# Patient Record
Sex: MALE | Race: BLACK OR AFRICAN AMERICAN | URBAN - METROPOLITAN AREA
[De-identification: names, ages, dates, MRNs, and addresses within clinical notes are randomized per-mention and may not be internally consistent; named-entity substitution may affect disease eponyms.]

---

## 2017-08-04 ENCOUNTER — OFFICE VISIT (OUTPATIENT)
Dept: FAMILY MEDICINE CLINIC | Age: 77
End: 2017-08-04

## 2017-08-04 ENCOUNTER — HOSPITAL ENCOUNTER (OUTPATIENT)
Dept: LAB | Age: 77
Discharge: HOME | End: 2017-08-04
Payer: MEDICARE

## 2017-08-04 VITALS
BODY MASS INDEX: 28.72 KG/M2 | TEMPERATURE: 97.4 F | HEIGHT: 67 IN | DIASTOLIC BLOOD PRESSURE: 87 MMHG | RESPIRATION RATE: 16 BRPM | OXYGEN SATURATION: 96 % | WEIGHT: 183 LBS | HEART RATE: 60 BPM | SYSTOLIC BLOOD PRESSURE: 128 MMHG

## 2017-08-04 DIAGNOSIS — E11.9 CONTROLLED TYPE 2 DIABETES MELLITUS WITHOUT COMPLICATION, WITHOUT LONG-TERM CURRENT USE OF INSULIN (HCC): Primary | ICD-10-CM

## 2017-08-04 DIAGNOSIS — Z13.31 SCREENING FOR DEPRESSION: ICD-10-CM

## 2017-08-04 DIAGNOSIS — T46.4X5A COUGH DUE TO ACE INHIBITOR: ICD-10-CM

## 2017-08-04 DIAGNOSIS — R05.8 COUGH DUE TO ACE INHIBITOR: ICD-10-CM

## 2017-08-04 DIAGNOSIS — Z12.11 COLON CANCER SCREENING: ICD-10-CM

## 2017-08-04 DIAGNOSIS — R97.20 ELEVATED PSA: ICD-10-CM

## 2017-08-04 DIAGNOSIS — E11.9 CONTROLLED TYPE 2 DIABETES MELLITUS WITHOUT COMPLICATION, WITHOUT LONG-TERM CURRENT USE OF INSULIN (HCC): ICD-10-CM

## 2017-08-04 DIAGNOSIS — E78.00 PURE HYPERCHOLESTEROLEMIA: ICD-10-CM

## 2017-08-04 DIAGNOSIS — H54.7 VISION PROBLEMS: ICD-10-CM

## 2017-08-04 LAB
ALBUMIN SERPL BCP-MCNC: 3.8 G/DL (ref 3.4–5)
ALBUMIN/GLOB SERPL: 1.1 {RATIO} (ref 0.8–1.7)
ALP SERPL-CCNC: 84 U/L (ref 45–117)
ALT SERPL-CCNC: 32 U/L (ref 16–61)
ANION GAP BLD CALC-SCNC: 8 MMOL/L (ref 3–18)
AST SERPL W P-5'-P-CCNC: 17 U/L (ref 15–37)
BILIRUB SERPL-MCNC: 0.6 MG/DL (ref 0.2–1)
BUN SERPL-MCNC: 13 MG/DL (ref 7–18)
BUN/CREAT SERPL: 9 (ref 12–20)
CALCIUM SERPL-MCNC: 9.5 MG/DL (ref 8.5–10.1)
CHLORIDE SERPL-SCNC: 107 MMOL/L (ref 100–108)
CHOLEST SERPL-MCNC: 190 MG/DL
CO2 SERPL-SCNC: 27 MMOL/L (ref 21–32)
CREAT SERPL-MCNC: 1.37 MG/DL (ref 0.6–1.3)
GLOBULIN SER CALC-MCNC: 3.5 G/DL (ref 2–4)
GLUCOSE SERPL-MCNC: 208 MG/DL (ref 74–99)
HBA1C MFR BLD HPLC: 6.7 %
HDLC SERPL-MCNC: 55 MG/DL (ref 40–60)
HDLC SERPL: 3.5 {RATIO} (ref 0–5)
LDLC SERPL CALC-MCNC: 89.6 MG/DL (ref 0–100)
LIPID PROFILE,FLP: ABNORMAL
POTASSIUM SERPL-SCNC: 4.6 MMOL/L (ref 3.5–5.5)
PROT SERPL-MCNC: 7.3 G/DL (ref 6.4–8.2)
PSA SERPL-MCNC: 3.9 NG/ML (ref 0–4)
SODIUM SERPL-SCNC: 142 MMOL/L (ref 136–145)
TRIGL SERPL-MCNC: 227 MG/DL (ref ?–150)
VLDLC SERPL CALC-MCNC: 45.4 MG/DL

## 2017-08-04 PROCEDURE — 84153 ASSAY OF PSA TOTAL: CPT | Performed by: FAMILY MEDICINE

## 2017-08-04 PROCEDURE — 82043 UR ALBUMIN QUANTITATIVE: CPT | Performed by: FAMILY MEDICINE

## 2017-08-04 PROCEDURE — 80061 LIPID PANEL: CPT | Performed by: FAMILY MEDICINE

## 2017-08-04 PROCEDURE — 80053 COMPREHEN METABOLIC PANEL: CPT | Performed by: FAMILY MEDICINE

## 2017-08-04 RX ORDER — GLIPIZIDE 5 MG/1
TABLET ORAL
Qty: 180 TAB | Refills: 3 | Status: SHIPPED | OUTPATIENT
Start: 2017-08-04 | End: 2017-10-30 | Stop reason: SDUPTHER

## 2017-08-04 RX ORDER — LOSARTAN POTASSIUM 25 MG/1
25 TABLET ORAL DAILY
Qty: 90 TAB | Refills: 3 | Status: SHIPPED | OUTPATIENT
Start: 2017-08-04 | End: 2017-10-30 | Stop reason: SDUPTHER

## 2017-08-04 RX ORDER — LANCETS
EACH MISCELLANEOUS
Qty: 2 PACKAGE | Refills: 5 | Status: SHIPPED | OUTPATIENT
Start: 2017-08-04 | End: 2017-08-10 | Stop reason: ALTCHOICE

## 2017-08-04 RX ORDER — METFORMIN HYDROCHLORIDE 500 MG/1
TABLET ORAL
Qty: 180 TAB | Refills: 3 | Status: SHIPPED | OUTPATIENT
Start: 2017-08-04 | End: 2017-10-30 | Stop reason: SDUPTHER

## 2017-08-04 RX ORDER — ATORVASTATIN CALCIUM 10 MG/1
TABLET, FILM COATED ORAL
Qty: 90 TAB | Refills: 3 | Status: SHIPPED | OUTPATIENT
Start: 2017-08-04 | End: 2017-10-30 | Stop reason: SDUPTHER

## 2017-08-04 RX ORDER — LISINOPRIL 10 MG/1
TABLET ORAL
Qty: 90 TAB | Refills: 3 | Status: SHIPPED | OUTPATIENT
Start: 2017-08-04 | End: 2017-08-04 | Stop reason: SINTOL

## 2017-08-04 NOTE — PROGRESS NOTES
HISTORY OF PRESENT ILLNESS  Frandy Hopkins is a 69 y.o. male. HPI Comments: Mr. Yonathan Wise is here for his yearly checkup-he lives in [place] and comes back here once a year for meds and labs. His only complaint is a dry cough for several months. He is on Lisinopril. He is due for a colonoscopy but wants to do the FIT test instead. He is due for an eye checkup also and has been having vision problems (blurred vision). He has a history of elevated PSA. His blood sugars were creeping up so he increased his glipizide back to the original BID dose, and thus has been out of it for a month or more. Diabetes   Pertinent negatives include no chest pain, no abdominal pain, no headaches and no shortness of breath. Review of Systems   Constitutional: Negative for chills and fever. HENT: Negative for sore throat. Eyes: Positive for blurred vision. Negative for double vision. Respiratory: Positive for cough. Negative for sputum production and shortness of breath. Cardiovascular: Negative for chest pain and palpitations. Gastrointestinal: Negative for abdominal pain, nausea and vomiting. Genitourinary: Negative for dysuria and urgency. Neurological: Negative for dizziness and headaches. Physical Exam   Constitutional: He appears well-developed and well-nourished. HENT:   Right Ear: Tympanic membrane normal.   Left Ear: Tympanic membrane normal.   Mouth/Throat: Oropharynx is clear and moist.   Eyes: Conjunctivae and EOM are normal. Pupils are equal, round, and reactive to light. Fundoscopic exam:       The right eye shows no hemorrhage. The left eye shows no hemorrhage. Difficult to see fundi due to pupillary constriction   Neck: Trachea normal. Neck supple. No thyroid mass and no thyromegaly present. Cardiovascular: Normal rate, regular rhythm and normal heart sounds. Pulmonary/Chest: Effort normal and breath sounds normal. He has no wheezes. He has no rhonchi. He has no rales. Abdominal: Soft. Normal appearance. There is no tenderness. There is no rebound and no guarding. Lymphadenopathy:     He has no cervical adenopathy. Neurological: He is alert. He has normal strength. No sensory deficit. Diabetic foot exam:     Left: Reflexes 2+     Filament test normal sensation with micro filament   Pulse DP: 2+ (normal)   Deformities: None  Right: Reflexes 2+   Filament test normal sensation with micro filament   Pulse DP: 2+ (normal)   Deformities: None     Skin: No rash noted. Nursing note and vitals reviewed. Results for orders placed or performed in visit on 08/04/17   AMB POC HEMOGLOBIN A1C   Result Value Ref Range    Hemoglobin A1c (POC) 6.7 %       ASSESSMENT and PLAN    ICD-10-CM ICD-9-CM    1. Controlled type 2 diabetes mellitus without complication, without long-term current use of insulin ([facility]) E11.9 250.00 glipiZIDE (GLUCOTROL) 5 mg tablet      Lancets (ONETOUCH ULTRASOFT LANCETS) misc      glucose blood VI test strips (ONETOUCH ULTRA TEST) strip      METABOLIC PANEL, COMPREHENSIVE      MICROALBUMIN, UR, RAND W/ MICROALBUMIN/CREA RATIO       DIABETES FOOT EXAM      AMB POC HEMOGLOBIN A1C      losartan (COZAAR) 25 mg tablet      DISCONTINUED: lisinopril (PRINIVIL, ZESTRIL) 10 mg tablet   2. Pure hypercholesterolemia E78.00 272.0 atorvastatin (LIPITOR) 10 mg tablet      LIPID PANEL   3. Elevated PSA R97.20 790.93 PROSTATE SPECIFIC AG (PSA)   4. Colon cancer screening Z12.11 V76.51 OCCULT BLOOD, IMMUNOASSAY (FIT)   5. Vision problems H54.7 V41.0 REFERRAL TO OPHTHALMOLOGY   6. Cough due to ACE inhibitor R05 786.2     T46.4X5A E942.6    7. Screening for depression Z13.89 V79.0 UT DEPRESSION SCREEN ANNUAL         See orders.

## 2017-08-04 NOTE — PATIENT INSTRUCTIONS
We will call you with the lab results Monday. See the eye doctor because of the vision issues and the diabetes. Return the colon cancer screening test.    Recheck as needed. STOP lisinopril, start Losartan for renal protection from your diabetes.

## 2017-08-04 NOTE — MR AVS SNAPSHOT
Visit Information Date & Time Provider Department Dept. Phone Encounter #  
 8/4/2017 12:45 PM Lexa Viera [facility] [phone] [id number] Follow-up Instructions Return if symptoms worsen or fail to improve. Your Appointments 9/27/2017  1:30 PM  
Office Visit with Lexa Viera MD  
[facility]) Appt Note:   
 Keon Lynch [address] [facility] [address]  
[phone]  
  
   
 [facility]  [facility]. [id number] Upcoming Health Maintenance Date Due FOBT Q 1 YEAR, 18+ 2/23/1958 DTaP/Tdap/Td series (1 - Tdap) 2/23/1962 ZOSTER VACCINE AGE 60> 12/23/2000 EYE EXAM RETINAL OR DILATED Q1 9/17/2016 HEMOGLOBIN A1C Q6M 3/26/2017 INFLUENZA AGE 9 TO ADULT 8/1/2017 GLAUCOMA SCREENING Q2Y 9/17/2017 Pneumococcal 65+ Low/Medium Risk (2 of 2 - PPSV23) 9/26/2017 FOOT EXAM Q1 9/26/2017 MICROALBUMIN Q1 9/26/2017 LIPID PANEL Q1 9/26/2017 MEDICARE YEARLY EXAM 9/27/2017 Allergies as of 8/4/2017  Review Complete On: 8/4/2017 By: Lexa Viera MD  
 No Known Allergies Current Immunizations  Never Reviewed Name Date Influenza High Dose Vaccine PF 9/26/2016 Pneumococcal Conjugate (PCV-13) 9/26/2016 Not reviewed this visit You Were Diagnosed With   
  
 Codes Comments Controlled type 2 diabetes mellitus without complication, without long-term current use of insulin ([facility]    -  Primary ICD-10-CM: E11.9 ICD-9-CM: 250.00 Pure hypercholesterolemia     ICD-10-CM: E78.00 ICD-9-CM: 272.0 Elevated PSA     ICD-10-CM: R97.20 ICD-9-CM: 790.93 Colon cancer screening     ICD-10-CM: Z12.11 ICD-9-CM: V76.51 Vision problems     ICD-10-CM: H54.7 ICD-9-CM: V41.0 Cough due to ACE inhibitor     ICD-10-CM: R05, T46.4X5A 
ICD-9-CM: 786.2, E942.6 Screening for depression     ICD-10-CM: Z13.89 ICD-9-CM: V79.0 Vitals BP Pulse Temp Resp Height(growth percentile) Weight(growth percentile) 128/87 (BP 1 Location: Right arm, BP Patient Position: Sitting) 60 97.4 °F (36.3 °C) (Oral) 16 5' 7\" (1.702 m) 183 lb (83 kg) SpO2 BMI Smoking Status 96% 28.66 kg/m2 Former Smoker BMI and BSA Data Body Mass Index Body Surface Area  
 28.66 kg/m 2 1.98 m 2 Preferred Pharmacy Pharmacy Name Phone [facility] [address] [facility], [address] [place] [address] [phone] Your Updated Medication List  
  
   
This list is accurate as of: 8/4/17  2:00 PM.  Always use your most recent med list.  
  
  
  
  
 * atorvastatin 10 mg tablet Commonly known as:  LIPITOR  
TAKE 1 TAB BY MOUTH NIGHTLY. * atorvastatin 10 mg tablet Commonly known as:  LIPITOR  
TAKE 1 TAB BY MOUTH NIGHTLY. Blood-Glucose Meter monitoring kit Ultra one touch * glipiZIDE 5 mg tablet Commonly known as:  Brayan Schillings Take 1 Tab by mouth every morning. * glipiZIDE 5 mg tablet Commonly known as:  GLUCOTROL  
TAKE 1 TAB BY MOUTH BID  
  
 glucose blood VI test strips strip Commonly known as:  ONETOUCH ULTRA TEST Use as directed to check blood sugars daily and prn symptoms Lancets Misc Commonly known as:  ONETOUCH ULTRASOFT LANCETS Use as directed to check blood sugars daily and prn symptoms  
  
 latanoprost 0.005 % ophthalmic solution Commonly known as:  Nonnie Bucy Administer 1 Drop to both eyes nightly. losartan 25 mg tablet Commonly known as:  COZAAR Take 1 Tab by mouth daily. * metFORMIN 500 mg tablet Commonly known as:  GLUCOPHAGE  
TAKE 1 TAB BY MOUTH TWO (2) TIMES DAILY (WITH MEALS). * metFORMIN 500 mg tablet Commonly known as:  GLUCOPHAGE  
TAKE 1 TAB BY MOUTH TWO (2) TIMES DAILY (WITH MEALS). * Notice: This list has 6 medication(s) that are the same as other medications prescribed for you.  Read the directions carefully, and ask your doctor or other care provider to review them with you. Prescriptions Sent to Pharmacy Refills  
 glipiZIDE (GLUCOTROL) 5 mg tablet 3 Sig: TAKE 1 TAB BY MOUTH BID Class: Normal  
 Pharmacy: [facility] [address] [facility] Ph #: [phone]  
 atorvastatin (LIPITOR) 10 mg tablet 3 Sig: TAKE 1 TAB BY MOUTH NIGHTLY. Class: Normal  
 Pharmacy: [facility] [address] [facility] Ph #: [phone]  
 metFORMIN (GLUCOPHAGE) 500 mg tablet 3 Sig: TAKE 1 TAB BY MOUTH TWO (2) TIMES DAILY (WITH MEALS). Class: Normal  
 Pharmacy: [facility] [address] Ph #: [phone] Lancets (ONETOUCH ULTRASOFT LANCETS) misc 5 Sig: Use as directed to check blood sugars daily and prn symptoms Class: Normal  
 Pharmacy: [facility] [address] [facility] Ph #: [phone]  
 glucose blood VI test strips (ONETOUCH ULTRA TEST) strip 3 Sig: Use as directed to check blood sugars daily and prn symptoms Class: Normal  
 Pharmacy: [facility] [address] [facility] Ph #: [phone]  
 losartan (COZAAR) 25 mg tablet 3 Sig: Take 1 Tab by mouth daily. Class: Normal  
 Pharmacy: [facility] [address] Ph #: [phone] Route: Oral  
  
We Performed the Following AMB POC HEMOGLOBIN A1C [45152 CPT(R)]  DIABETES FOOT EXAM [HM7 Custom] [address] [place] [address] [facility] [ [facility]] REFERRAL TO OPHTHALMOLOGY [REF57 Custom] Comments:  
 Please evaluate patient for vision problems, diabetic exam.  
  
Follow-up Instructions Return if symptoms worsen or fail to improve. Referral Information Referral ID Referred By Referred To  
  
 [id number], [address] [place] [address], [address] [facility] Phone: [phone] Fax: [phone] Visits Status Start Date End Date 1 New Request 8/4/17 8/4/18 If your referral has a status of pending review or denied, additional information will be sent to support the outcome of this decision. Patient Instructions We will call you with the lab results Monday. See the eye doctor because of the vision issues and the diabetes. Return the colon cancer screening test. 
 
Recheck as needed. STOP lisinopril, start Losartan for renal protection from your diabetes. Introducing [facility]! Camron Arndt introduces [employer] patient portal. Now you can access parts of your medical record, email your doctor's office, and request medication refills online. 1. In your internet browser, go to [email or web address] 2. Click on the First Time User? Click Here link in the Sign In box. You will see the New Member Sign Up page. 3. Enter your [employer] Access Code exactly as it appears below. You will not need to use this code after youve completed the sign-up process. If you do not sign up before the expiration date, you must request a new code. · [employer] Access Code: [email or web address] Expires: 11/2/2017  2:00 PM 
 
4. Enter the last four digits of your Social Security Number (xxxx) and Date of Birth (mm/dd/yyyy) as indicated and click Submit. You will be taken to the next sign-up page. 5. Create a [employer] ID. This will be your [employer] login ID and cannot be changed, so think of one that is secure and easy to remember. 6. Create a [employer] password. You can change your password at any time. 7. Enter your Password Reset Question and Answer. This can be used at a later time if you forget your password. 8. Enter your e-mail address. You will receive e-mail notification when new information is available in [address] 9. Click Sign Up. You can now view and download portions of your medical record.  
10. Click the Download Summary menu link to download a portable copy of your medical information. If you have questions, please visit the Frequently Asked Questions section of the [employer] website. Remember, [employer] is NOT to be used for urgent needs. For medical emergencies, dial 911. Now available from your iPhone and Android! Please provide this summary of care documentation to your next provider. Your primary care clinician is listed as Migue Burrell. If you have any questions after today's visit, please call [phone].

## 2017-08-04 NOTE — PROGRESS NOTES
Patient presents to the clinic for a follow up on his diabetes. Patient would also like to discuss a dry cough that began 2 months ago.     Requested Prescriptions     Pending Prescriptions Disp Refills    lisinopril (PRINIVIL, ZESTRIL) 10 mg tablet 90 Tab 0    glipiZIDE (GLUCOTROL) 5 mg tablet 90 Tab 0    atorvastatin (LIPITOR) 10 mg tablet 90 Tab 0    metFORMIN (GLUCOPHAGE) 500 mg tablet 180 Tab 0    Lancets (ONETOUCH ULTRASOFT LANCETS) misc 1 Package 11     Sig: Use as directed to check blood sugars twice daily    glucose blood VI test strips (ONETOUCH ULTRA TEST) strip 60 Strip 2     Sig: Use as directed to check blood sugars twice daily

## 2017-08-05 LAB
CREAT UR-MCNC: 107.21 MG/DL (ref 30–125)
MICROALBUMIN UR-MCNC: 31 MG/DL (ref 0–3)
MICROALBUMIN/CREAT UR-RTO: 289 MG/G (ref 0–30)

## 2017-08-08 ENCOUNTER — HOSPITAL ENCOUNTER (OUTPATIENT)
Dept: LAB | Age: 77
Discharge: HOME | End: 2017-08-08
Payer: MEDICARE

## 2017-08-08 DIAGNOSIS — Z12.11 COLON CANCER SCREENING: ICD-10-CM

## 2017-08-08 PROCEDURE — 82274 ASSAY TEST FOR BLOOD FECAL: CPT | Performed by: FAMILY MEDICINE

## 2017-08-10 ENCOUNTER — TELEPHONE (OUTPATIENT)
Dept: FAMILY MEDICINE CLINIC | Age: 77
End: 2017-08-10

## 2017-08-10 RX ORDER — LATANOPROST 50 UG/ML
1 SOLUTION/ DROPS OPHTHALMIC
Qty: 7.5 ML | Refills: 0 | Status: SHIPPED | OUTPATIENT
Start: 2017-08-10

## 2017-08-10 RX ORDER — LANCETS 33 GAUGE
EACH MISCELLANEOUS
Qty: 100 LANCET | Refills: 3 | Status: SHIPPED | OUTPATIENT
Start: 2017-08-10

## 2017-08-11 ENCOUNTER — TELEPHONE (OUTPATIENT)
Dept: FAMILY MEDICINE CLINIC | Age: 77
End: 2017-08-11

## 2017-08-11 NOTE — TELEPHONE ENCOUNTER
Pt called asking if Dr. Yudith Crisostomo put in a referral for a dentist for he and his wife. I informed the pt that I did not see any referral for dentistry but would bring this up to the Dr. Di Moreno verbalized understanding.

## 2017-08-16 LAB — HEMOCCULT STL QL IA: NEGATIVE

## 2017-08-17 NOTE — TELEPHONE ENCOUNTER
Pt called again in regards to a dentist. I informed the pt to call [employer] and see who is in network. Pt verbalized understanding.

## 2017-09-05 ENCOUNTER — TELEPHONE (OUTPATIENT)
Dept: FAMILY MEDICINE CLINIC | Age: 77
End: 2017-09-05

## 2017-09-05 NOTE — TELEPHONE ENCOUNTER
Danielle 2 and corrected the glucometer brand with the pharmacy tech. Pharmacy tech stated they will use the prescription information from the onetouch and apply it to the Accucheck.

## 2017-09-05 NOTE — TELEPHONE ENCOUNTER
Pt calling to state [employer] will no longer cover hisOne Touch test strips. Pt noted [employer] will cover Accucheck, so he is requesting the machine and the strips.

## 2017-09-18 PROBLEM — H40.2230 CHRONIC ANGLE-CLOSURE GLAUCOMA OF BOTH EYES: Status: ACTIVE | Noted: 2017-09-18

## 2017-10-30 DIAGNOSIS — E11.9 CONTROLLED TYPE 2 DIABETES MELLITUS WITHOUT COMPLICATION, WITHOUT LONG-TERM CURRENT USE OF INSULIN (HCC): ICD-10-CM

## 2017-10-30 DIAGNOSIS — E78.00 PURE HYPERCHOLESTEROLEMIA: ICD-10-CM

## 2017-10-31 RX ORDER — METFORMIN HYDROCHLORIDE 500 MG/1
TABLET ORAL
Qty: 180 TAB | Refills: 3 | Status: SHIPPED | OUTPATIENT
Start: 2017-10-31

## 2017-10-31 RX ORDER — GLIPIZIDE 5 MG/1
TABLET ORAL
Qty: 180 TAB | Refills: 3 | Status: SHIPPED | OUTPATIENT
Start: 2017-10-31

## 2017-10-31 RX ORDER — LOSARTAN POTASSIUM 25 MG/1
25 TABLET ORAL DAILY
Qty: 90 TAB | Refills: 3 | Status: SHIPPED | OUTPATIENT
Start: 2017-10-31

## 2017-10-31 RX ORDER — ATORVASTATIN CALCIUM 10 MG/1
TABLET, FILM COATED ORAL
Qty: 90 TAB | Refills: 3 | Status: SHIPPED | OUTPATIENT
Start: 2017-10-31